# Patient Record
Sex: MALE | Employment: FULL TIME | URBAN - METROPOLITAN AREA
[De-identification: names, ages, dates, MRNs, and addresses within clinical notes are randomized per-mention and may not be internally consistent; named-entity substitution may affect disease eponyms.]

---

## 2019-11-26 ENCOUNTER — TRANSFERRED RECORDS (OUTPATIENT)
Dept: HEALTH INFORMATION MANAGEMENT | Facility: CLINIC | Age: 59
End: 2019-11-26

## 2021-02-09 ENCOUNTER — TELEPHONE (OUTPATIENT)
Dept: CARDIOLOGY | Facility: CLINIC | Age: 61
End: 2021-02-09

## 2021-02-09 NOTE — TELEPHONE ENCOUNTER
Left a vm for pt requesting he call us back at [phone]. Looking for any info or records for upcoming appt with Dr Das.

## 2021-02-15 NOTE — TELEPHONE ENCOUNTER
States he would like to be seen for elevated BP.   Recently in the dental clinic for removal of wisdom teeth, and had a BP of 157/100. Thought he should be seen by someone. His prior primary MD was Cipriano Dias out of the [facility], who has since moved out of state. He has not re-established with a new primary, which I told him that he needed to do.   Denies any HX of diabetes, CAD or kidney disease.   Stated he had some kind of a cardiac test done at [facility] years ago, unaware of what the test was. None noted in Epic.  Would just like to be seen due to elevated BP concerns.   Instructed to buy an upper arm cuff BP machine for at home and start taking his BP daily or every other day at the same time, charting it for his upcoming OV.   This will give us a better picture of his overall BP.   Will fax request to [facility] for most recent labs and any cardiac testing results along with last office visit  Note.   States he has not visited the doctor's office very often.

## 2021-03-04 ENCOUNTER — OFFICE VISIT (OUTPATIENT)
Dept: CARDIOLOGY | Facility: CLINIC | Age: 61
End: 2021-03-04
Payer: COMMERCIAL

## 2021-03-04 ENCOUNTER — TELEPHONE (OUTPATIENT)
Dept: CARDIOLOGY | Facility: CLINIC | Age: 61
End: 2021-03-04

## 2021-03-04 VITALS
BODY MASS INDEX: 35.25 KG/M2 | DIASTOLIC BLOOD PRESSURE: 94 MMHG | WEIGHT: 238 LBS | HEART RATE: 50 BPM | SYSTOLIC BLOOD PRESSURE: 151 MMHG | HEIGHT: 69 IN

## 2021-03-04 DIAGNOSIS — Z13.6 SCREENING FOR CARDIOVASCULAR CONDITION: Primary | ICD-10-CM

## 2021-03-04 DIAGNOSIS — Z13.6 SCREENING FOR CARDIOVASCULAR CONDITION: ICD-10-CM

## 2021-03-04 DIAGNOSIS — I10 BENIGN ESSENTIAL HYPERTENSION: ICD-10-CM

## 2021-03-04 LAB
ANION GAP SERPL CALCULATED.3IONS-SCNC: 3 MMOL/L (ref 3–14)
BUN SERPL-MCNC: 13 MG/DL (ref 7–30)
CALCIUM SERPL-MCNC: 9 MG/DL (ref 8.5–10.1)
CHLORIDE SERPL-SCNC: 106 MMOL/L (ref 94–109)
CHOLEST SERPL-MCNC: 178 MG/DL
CO2 SERPL-SCNC: 32 MMOL/L (ref 20–32)
CREAT SERPL-MCNC: 0.93 MG/DL (ref 0.66–1.25)
ERYTHROCYTE [DISTWIDTH] IN BLOOD BY AUTOMATED COUNT: 13.3 % (ref 10–15)
GFR SERPL CREATININE-BSD FRML MDRD: 89 ML/MIN/{1.73_M2}
GLUCOSE SERPL-MCNC: 114 MG/DL (ref 70–99)
HCT VFR BLD AUTO: 46.2 % (ref 40–53)
HDLC SERPL-MCNC: 42 MG/DL
HGB BLD-MCNC: 14.7 G/DL (ref 13.3–17.7)
LDLC SERPL CALC-MCNC: 122 MG/DL
MCH RBC QN AUTO: 28.8 PG (ref 26.5–33)
MCHC RBC AUTO-ENTMCNC: 31.8 G/DL (ref 31.5–36.5)
MCV RBC AUTO: 90 FL (ref 78–100)
NONHDLC SERPL-MCNC: 136 MG/DL
PLATELET # BLD AUTO: 247 10E9/L (ref 150–450)
POTASSIUM SERPL-SCNC: 4.1 MMOL/L (ref 3.4–5.3)
RBC # BLD AUTO: 5.11 10E12/L (ref 4.4–5.9)
SODIUM SERPL-SCNC: 141 MMOL/L (ref 133–144)
TRIGL SERPL-MCNC: 68 MG/DL
WBC # BLD AUTO: 6 10E9/L (ref 4–11)

## 2021-03-04 PROCEDURE — 80048 BASIC METABOLIC PNL TOTAL CA: CPT | Performed by: INTERNAL MEDICINE

## 2021-03-04 PROCEDURE — 80061 LIPID PANEL: CPT | Performed by: INTERNAL MEDICINE

## 2021-03-04 PROCEDURE — 85027 COMPLETE CBC AUTOMATED: CPT | Performed by: INTERNAL MEDICINE

## 2021-03-04 PROCEDURE — 93000 ELECTROCARDIOGRAM COMPLETE: CPT | Performed by: INTERNAL MEDICINE

## 2021-03-04 PROCEDURE — 36415 COLL VENOUS BLD VENIPUNCTURE: CPT | Performed by: INTERNAL MEDICINE

## 2021-03-04 PROCEDURE — 99203 OFFICE O/P NEW LOW 30 MIN: CPT | Performed by: INTERNAL MEDICINE

## 2021-03-04 RX ORDER — TIMOLOL 5 MG/ML
1 SOLUTION/ DROPS OPHTHALMIC 2 TIMES DAILY
COMMUNITY

## 2021-03-04 RX ORDER — AMLODIPINE BESYLATE 5 MG/1
5 TABLET ORAL DAILY
Qty: 30 TABLET | Refills: 3 | Status: SHIPPED | OUTPATIENT
Start: 2021-03-04 | End: 2021-07-22

## 2021-03-04 SDOH — HEALTH STABILITY: MENTAL HEALTH: HOW OFTEN DO YOU HAVE 6 OR MORE DRINKS ON ONE OCCASION?: NOT ASKED

## 2021-03-04 SDOH — HEALTH STABILITY: MENTAL HEALTH: HOW OFTEN DO YOU HAVE A DRINK CONTAINING ALCOHOL?: NOT ASKED

## 2021-03-04 SDOH — HEALTH STABILITY: MENTAL HEALTH: HOW MANY STANDARD DRINKS CONTAINING ALCOHOL DO YOU HAVE ON A TYPICAL DAY?: NOT ASKED

## 2021-03-04 ASSESSMENT — MIFFLIN-ST. JEOR: SCORE: 1879.94

## 2021-03-04 NOTE — PROGRESS NOTES
HPI and Plan:   It is my pleasure to see this very pleasant 60-year-old gentleman for evaluation of hypertension.    He no longer has a primary care physician as his neighbor and primary physician Dr. Cipriano Dias has left for [place].    He has known about hypertension for some time but has not been on any medications.  His father had hypertension.  He lived to his 80s.  He also had a slow heart rate which this patient has.  His EKG shows sinus bradycardia but he has no symptoms at all from this condition.  He is not on any medications to slow down his heart rate either and as such physiologic sinus bradycardia is actually a positive finding.    He does not smoke drink or abuse drugs.  He is not diabetic and to the best of his knowledge does not have hypercholesterolemia.  He is fasting at this time and I will check his basic labs today as well as his lipid profile.    Body mass index is 35.  He exercises every other day doing 20 minutes on the bike.  I informed him that this amount of exercise is not sufficient to promote weight loss.  He should try at least half an hour to an hour every day of aerobic exercise.  He knows he has too much sugar and fat in his diet.  He is exercising portion control and has lost about 10 pounds already.    Cardiac examination is unremarkable.    He showed me his blood pressure readings from home and they have consistently been between 1 50-1 60 systolic and  diastolic.  Until his efforts with hygienic measures bear fruit, I think you benefit from a low-dose of medications.  I will start him off on 5 mg of amlodipine.  He will continue with blood pressure monitoring.  I will see him again in a month to see how he is doing.    I have also recommended some of my good internist friends at [facility] internal medicine clinic to him.    Orders Placed This Encounter   Procedures     Basic metabolic panel     Lipid Profile     CBC with platelets     Follow-Up with Cardiologist      EKG 12-lead complete w/read - Clinics (performed today)       Orders Placed This Encounter   Medications     timolol, PF, (TIMOPTIC OCUDOSE) 0.5 % ophthalmic solution     Si drop 2 times daily     UNABLE TO FIND     Sig: Altanoprost     amLODIPine (NORVASC) 5 MG tablet     Sig: Take 1 tablet (5 mg) by mouth daily     Dispense:  30 tablet     Refill:  3       Encounter Diagnoses   Name Primary?     Screening for cardiovascular condition Yes     Benign essential hypertension        CURRENT MEDICATIONS:  Current Outpatient Medications   Medication Sig Dispense Refill     amLODIPine (NORVASC) 5 MG tablet Take 1 tablet (5 mg) by mouth daily 30 tablet 3     timolol, PF, (TIMOPTIC OCUDOSE) 0.5 % ophthalmic solution 1 drop 2 times daily       UNABLE TO FIND Altanoprost       CEFTIN 500 MG OR TABS 1 TABLET EVERY 12 HOURS (Patient not taking: No sig reported) 28 0     EPIPEN 2-HERI Use as Directed PRN (Patient not taking: Reported on 3/4/2021) 1 prn     FEXOFENADINE  MG OR TABS 1 TABLET DAILY (Patient not taking: No sig reported) 30 0     PREDNISONE 10 MG OR TABS 4 tabs daily for 2 days then 3 tabs daily for  days then 2 tabs daily for  days then 1 tab daily for 1 day then one half tab daily for 1 day then stop (Patient not taking: No sig reported) qs 0     RANITIDINE  MG OR TABS 1 TABLET TWICE DAILY (Patient not taking: No sig reported) 60 0       ALLERGIES     Allergies   Allergen Reactions     Penicillamine        PAST MEDICAL HISTORY:  Past Medical History:   Diagnosis Date     Allergic rhinitis due to other allergen      Nasal/sinus dis NEC since childhood       PAST SURGICAL HISTORY:  Past Surgical History:   Procedure Laterality Date     C ANESTH,NOSE,SINUS SURGERY       C APPENDECTOMY         FAMILY HISTORY:  Family History   Problem Relation Age of Onset     Cancer - colorectal No family hx of      Prostate Cancer No family hx of      C.A.D. No family hx of      Hypertension No family hx of   "    Diabetes No family hx of        SOCIAL HISTORY:  Social History     Socioeconomic History     Marital status:      Spouse name: Sangita     Number of children: 3     Years of education: None     Highest education level: None   Occupational History     None   Social Needs     Financial resource strain: None     Food insecurity     Worry: None     Inability: None     Transportation needs     Medical: None     Non-medical: None   Tobacco Use     Smoking status: Never Smoker     Smokeless tobacco: Never Used   Substance and Sexual Activity     Alcohol use: Yes     Comment: occ     Drug use: No     Sexual activity: Yes     Partners: Female   Lifestyle     Physical activity     Days per week: None     Minutes per session: None     Stress: None   Relationships     Social connections     Talks on phone: None     Gets together: None     Attends Zoroastrian service: None     Active member of club or organization: None     Attends meetings of clubs or organizations: None     Relationship status: None     Intimate partner violence     Fear of current or ex partner: None     Emotionally abused: None     Physically abused: None     Forced sexual activity: None   Other Topics Concern     None   Social History Narrative    from [place], [place];  1976; 3 boys               Review of Systems:  Skin:  Negative     Eyes:  Negative    ENT:  Positive for hearing loss  Respiratory:  Positive for dyspnea on exertion  Cardiovascular:  Negative    Gastroenterology: Negative    Genitourinary:  not assessed    Musculoskeletal:  Positive for back pain  Neurologic:  Positive for headaches;numbness or tingling of hands  Psychiatric:  Negative    Heme/Lymph/Imm:  Positive for allergies  Endocrine:  Negative      Physical Exam:  Vitals: BP (!) 151/94   Pulse 50   Ht 1.753 m (5' 9\")   Wt 108 kg (238 lb)   BMI 35.15 kg/m      Constitutional:  cooperative, alert and oriented, well developed, well nourished, in no acute distress    "     Skin:  warm and dry to the touch, no apparent skin lesions or masses noted          Head:  normocephalic, no masses or lesions        Eyes:  pupils equal and round, conjunctivae and lids unremarkable, sclera white, no xanthalasma, EOMS intact, no nystagmus        Lymph:No Cervical lymphadenopathy present     ENT:  no pallor or cyanosis, dentition good        Neck:  carotid pulses are full and equal bilaterally, JVP normal, no carotid bruit        Respiratory:  normal breath sounds, clear to auscultation, normal A-P diameter, normal symmetry, normal respiratory excursion, no use of accessory muscles         Cardiac: regular rhythm, normal S1/S2, no S3 or S4, apical impulse not displaced, no murmurs, gallops or rubs                pulses full and equal, no bruits auscultated                                        GI:  abdomen soft, non-tender, BS normoactive, no mass, no HSM, no bruits        Extremities and Muscular Skeletal:  no deformities, clubbing, cyanosis, erythema observed              Neurological:  no gross motor deficits        Psych:  Alert and Oriented x 3        Recent Lab Results:  LIPID RESULTS:  No results found for: CHOL, HDL, LDL, TRIG, CHOLHDLRATIO    LIVER ENZYME RESULTS:  No results found for: AST, ALT    CBC RESULTS:  No results found for: WBC, RBC, HGB, HCT, MCV, MCH, MCHC, RDW, PLT    BMP RESULTS:  No results found for: NA, POTASSIUM, CHLORIDE, CO2, ANIONGAP, GLC, BUN, CR, GFRESTIMATED, GFRESTBLACK, ASHVIN     A1C RESULTS:  No results found for: A1C    INR RESULTS:  No results found for: INR        CC  No referring provider defined for this encounter.

## 2021-03-04 NOTE — LETTER
3/4/2021    Physician No Ref-Primary  No address on file    RE: Alvino Rivera       Dear Colleague,    I had the pleasure of seeing Alvino WAYNE Iverson in the [facility].    HPI and Plan:   It is my pleasure to see this very pleasant 60-year-old gentleman for evaluation of hypertension.    He no longer has a primary care physician as his neighbor and primary physician Dr. Cipriano Dias has left for [place].    He has known about hypertension for some time but has not been on any medications.  His father had hypertension.  He lived to his 80s.  He also had a slow heart rate which this patient has.  His EKG shows sinus bradycardia but he has no symptoms at all from this condition.  He is not on any medications to slow down his heart rate either and as such physiologic sinus bradycardia is actually a positive finding.    He does not smoke drink or abuse drugs.  He is not diabetic and to the best of his knowledge does not have hypercholesterolemia.  He is fasting at this time and I will check his basic labs today as well as his lipid profile.    Body mass index is 35.  He exercises every other day doing 20 minutes on the bike.  I informed him that this amount of exercise is not sufficient to promote weight loss.  He should try at least half an hour to an hour every day of aerobic exercise.  He knows he has too much sugar and fat in his diet.  He is exercising portion control and has lost about 10 pounds already.    Cardiac examination is unremarkable.    He showed me his blood pressure readings from home and they have consistently been between 1 50-1 60 systolic and  diastolic.  Until his efforts with hygienic measures bear fruit, I think you benefit from a low-dose of medications.  I will start him off on 5 mg of amlodipine.  He will continue with blood pressure monitoring.  I will see him again in a month to see how he is doing.    I have also recommended some  of my good internist friends at [facility] internal medicine clinic to him.    Orders Placed This Encounter   Procedures     Basic metabolic panel     Lipid Profile     CBC with platelets     Follow-Up with Cardiologist     EKG 12-lead complete w/read - Clinics (performed today)       Orders Placed This Encounter   Medications     timolol, PF, (TIMOPTIC OCUDOSE) 0.5 % ophthalmic solution     Si drop 2 times daily     UNABLE TO FIND     Sig: Altanoprost     amLODIPine (NORVASC) 5 MG tablet     Sig: Take 1 tablet (5 mg) by mouth daily     Dispense:  30 tablet     Refill:  3       Encounter Diagnoses   Name Primary?     Screening for cardiovascular condition Yes     Benign essential hypertension        CURRENT MEDICATIONS:  Current Outpatient Medications   Medication Sig Dispense Refill     amLODIPine (NORVASC) 5 MG tablet Take 1 tablet (5 mg) by mouth daily 30 tablet 3     timolol, PF, (TIMOPTIC OCUDOSE) 0.5 % ophthalmic solution 1 drop 2 times daily       UNABLE TO FIND Altanoprost       CEFTIN 500 MG OR TABS 1 TABLET EVERY 12 HOURS (Patient not taking: No sig reported) 28 0     EPIPEN 2-HERI Use as Directed PRN (Patient not taking: Reported on 3/4/2021) 1 prn     FEXOFENADINE  MG OR TABS 1 TABLET DAILY (Patient not taking: No sig reported) 30 0     PREDNISONE 10 MG OR TABS 4 tabs daily for 2 days then 3 tabs daily for  days then 2 tabs daily for  days then 1 tab daily for 1 day then one half tab daily for 1 day then stop (Patient not taking: No sig reported) qs 0     RANITIDINE  MG OR TABS 1 TABLET TWICE DAILY (Patient not taking: No sig reported) 60 0       ALLERGIES     Allergies   Allergen Reactions     Penicillamine        PAST MEDICAL HISTORY:  Past Medical History:   Diagnosis Date     Allergic rhinitis due to other allergen      Nasal/sinus dis NEC since childhood       PAST SURGICAL HISTORY:  Past Surgical History:   Procedure Laterality Date     C ANESTH,NOSE,SINUS SURGERY       C  APPENDECTOMY  1969       FAMILY HISTORY:  Family History   Problem Relation Age of Onset     Cancer - colorectal No family hx of      Prostate Cancer No family hx of      C.A.D. No family hx of      Hypertension No family hx of      Diabetes No family hx of        SOCIAL HISTORY:  Social History     Socioeconomic History     Marital status:      Spouse name: Sangita     Number of children: 3     Years of education: None     Highest education level: None   Occupational History     None   Social Needs     Financial resource strain: None     Food insecurity     Worry: None     Inability: None     Transportation needs     Medical: None     Non-medical: None   Tobacco Use     Smoking status: Never Smoker     Smokeless tobacco: Never Used   Substance and Sexual Activity     Alcohol use: Yes     Comment: occ     Drug use: No     Sexual activity: Yes     Partners: Female   Lifestyle     Physical activity     Days per week: None     Minutes per session: None     Stress: None   Relationships     Social connections     Talks on phone: None     Gets together: None     Attends Cheondoism service: None     Active member of club or organization: None     Attends meetings of clubs or organizations: None     Relationship status: None     Intimate partner violence     Fear of current or ex partner: None     Emotionally abused: None     Physically abused: None     Forced sexual activity: None   Other Topics Concern     None   Social History Narrative    from [place];  1976; 3 boys               Review of Systems:  Skin:  Negative     Eyes:  Negative    ENT:  Positive for hearing loss  Respiratory:  Positive for dyspnea on exertion  Cardiovascular:  Negative    Gastroenterology: Negative    Genitourinary:  not assessed    Musculoskeletal:  Positive for back pain  Neurologic:  Positive for headaches;numbness or tingling of hands  Psychiatric:  Negative    Heme/Lymph/Imm:  Positive for allergies  Endocrine:  Negative   "    Physical Exam:  Vitals: BP (!) 151/94   Pulse 50   Ht 1.753 m (5' 9\")   Wt 108 kg (238 lb)   BMI 35.15 kg/m      Constitutional:  cooperative, alert and oriented, well developed, well nourished, in no acute distress        Skin:  warm and dry to the touch, no apparent skin lesions or masses noted          Head:  normocephalic, no masses or lesions        Eyes:  pupils equal and round, conjunctivae and lids unremarkable, sclera white, no xanthalasma, EOMS intact, no nystagmus        Lymph:No Cervical lymphadenopathy present     ENT:  no pallor or cyanosis, dentition good        Neck:  carotid pulses are full and equal bilaterally, JVP normal, no carotid bruit        Respiratory:  normal breath sounds, clear to auscultation, normal A-P diameter, normal symmetry, normal respiratory excursion, no use of accessory muscles         Cardiac: regular rhythm, normal S1/S2, no S3 or S4, apical impulse not displaced, no murmurs, gallops or rubs                pulses full and equal, no bruits auscultated                                        GI:  abdomen soft, non-tender, BS normoactive, no mass, no HSM, no bruits        Extremities and Muscular Skeletal:  no deformities, clubbing, cyanosis, erythema observed              Neurological:  no gross motor deficits        Psych:  Alert and Oriented x 3        Recent Lab Results:  LIPID RESULTS:  No results found for: CHOL, HDL, LDL, TRIG, CHOLHDLRATIO    LIVER ENZYME RESULTS:  No results found for: AST, ALT    CBC RESULTS:  No results found for: WBC, RBC, HGB, HCT, MCV, MCH, MCHC, RDW, PLT    BMP RESULTS:  No results found for: NA, POTASSIUM, CHLORIDE, CO2, ANIONGAP, GLC, BUN, CR, GFRESTIMATED, GFRESTBLACK, ASHVIN     A1C RESULTS:  No results found for: A1C    INR RESULTS:  No results found for: INR        CC  No referring provider defined for this encounter.                    Thank you for allowing me to participate in the care of your patient.      Sincerely,     DR BAKARI LEON" MD CARLOS     [facility]  cc:   No referring provider defined for this encounter.

## 2021-03-04 NOTE — TELEPHONE ENCOUNTER
Left message with Dr. Das's review of labs drawn today and if patient would like more details prior to OV next month to call Team [phone].        ----- Message from Zhang Das MD sent at 3/4/2021 11:48 AM CST -----  Pls let him know labs are good so far, will further review at OV.  Thanks.

## 2021-03-04 NOTE — LETTER
Date:April 8, 2021      Patient was self referred, no letter generated. Do not send.        [facility] Health Information

## 2021-04-21 ENCOUNTER — OFFICE VISIT (OUTPATIENT)
Dept: CARDIOLOGY | Facility: CLINIC | Age: 61
End: 2021-04-21
Attending: INTERNAL MEDICINE
Payer: COMMERCIAL

## 2021-04-21 VITALS
DIASTOLIC BLOOD PRESSURE: 97 MMHG | SYSTOLIC BLOOD PRESSURE: 159 MMHG | HEIGHT: 69 IN | HEART RATE: 49 BPM | BODY MASS INDEX: 35.99 KG/M2 | WEIGHT: 243 LBS

## 2021-04-21 DIAGNOSIS — Z13.6 SCREENING FOR CARDIOVASCULAR CONDITION: ICD-10-CM

## 2021-04-21 DIAGNOSIS — I10 BENIGN ESSENTIAL HYPERTENSION: ICD-10-CM

## 2021-04-21 PROCEDURE — 99214 OFFICE O/P EST MOD 30 MIN: CPT | Performed by: INTERNAL MEDICINE

## 2021-04-21 RX ORDER — LISINOPRIL 5 MG/1
5 TABLET ORAL DAILY
Qty: 30 TABLET | Refills: 3 | Status: SHIPPED | OUTPATIENT
Start: 2021-04-21 | End: 2021-10-04

## 2021-04-21 ASSESSMENT — MIFFLIN-ST. JEOR: SCORE: 1897.62

## 2021-04-21 NOTE — LETTER
Date:Keeley 10, 2021      Patient was self referred, no letter generated. Do not send.        [facility] Health Information

## 2021-04-21 NOTE — PROGRESS NOTES
HPI and Plan:   The pleasure to see this delightful gentleman again for follow-up of hypertension.  He ran out of meds recently and therefore his blood pressure is little high in our offices.  Told him to renew amlodipine which is well-tolerated.  However blood pressure at home is still around 150/90.  I think it is time to add another medication I think lisinopril will make it very good addition to amlodipine.  Possible side effects were discussed.  I will have him come back in a month's time to have his mixed lites checked.  We will continue to monitor his home blood pressures aiming for around 130/80 or less.  I look forward to seeing him again in a few months time for further follow-up.    Orders Placed This Encounter   Procedures     Basic metabolic panel     Follow-Up with Cardiologist       Orders Placed This Encounter   Medications     lisinopril (ZESTRIL) 5 MG tablet     Sig: Take 1 tablet (5 mg) by mouth daily     Dispense:  30 tablet     Refill:  3       Encounter Diagnoses   Name Primary?     Screening for cardiovascular condition      Benign essential hypertension        CURRENT MEDICATIONS:  Current Outpatient Medications   Medication Sig Dispense Refill     lisinopril (ZESTRIL) 5 MG tablet Take 1 tablet (5 mg) by mouth daily 30 tablet 3     timolol, PF, (TIMOPTIC OCUDOSE) 0.5 % ophthalmic solution 1 drop 2 times daily       UNABLE TO FIND Altanoprost       amLODIPine (NORVASC) 5 MG tablet Take 1 tablet (5 mg) by mouth daily (Patient not taking: Reported on 4/21/2021) 30 tablet 3     CEFTIN 500 MG OR TABS 1 TABLET EVERY 12 HOURS (Patient not taking: No sig reported) 28 0     EPIPEN 2-HERI Use as Directed PRN (Patient not taking: Reported on 3/4/2021) 1 prn     FEXOFENADINE  MG OR TABS 1 TABLET DAILY (Patient not taking: No sig reported) 30 0     PREDNISONE 10 MG OR TABS 4 tabs daily for 2 days then 3 tabs daily for  days then 2 tabs daily for  days then 1 tab daily for 1 day then one half tab  daily for 1 day then stop (Patient not taking: No sig reported) qs 0     RANITIDINE  MG OR TABS 1 TABLET TWICE DAILY (Patient not taking: No sig reported) 60 0       ALLERGIES     Allergies   Allergen Reactions     Penicillamine        PAST MEDICAL HISTORY:  Past Medical History:   Diagnosis Date     Allergic rhinitis due to other allergen      Nasal/sinus dis NEC since childhood       PAST SURGICAL HISTORY:  Past Surgical History:   Procedure Laterality Date     C ANESTH,NOSE,SINUS SURGERY  1980     C APPENDECTOMY  1969       FAMILY HISTORY:  Family History   Problem Relation Age of Onset     Cancer - colorectal No family hx of      Prostate Cancer No family hx of      C.A.D. No family hx of      Hypertension No family hx of      Diabetes No family hx of        SOCIAL HISTORY:  Social History     Socioeconomic History     Marital status:      Spouse name: Sangita     Number of children: 3     Years of education: None     Highest education level: None   Occupational History     None   Social Needs     Financial resource strain: None     Food insecurity     Worry: None     Inability: None     Transportation needs     Medical: None     Non-medical: None   Tobacco Use     Smoking status: Never Smoker     Smokeless tobacco: Never Used   Substance and Sexual Activity     Alcohol use: Yes     Comment: occ     Drug use: No     Sexual activity: Yes     Partners: Female   Lifestyle     Physical activity     Days per week: None     Minutes per session: None     Stress: None   Relationships     Social connections     Talks on phone: None     Gets together: None     Attends Restorationism service: None     Active member of club or organization: None     Attends meetings of clubs or organizations: None     Relationship status: None     Intimate partner violence     Fear of current or ex partner: None     Emotionally abused: None     Physically abused: None     Forced sexual activity: None   Other Topics Concern     None  "  Social History Narrative    from [place], [place];  1976; 3 boys               Review of Systems:  Skin:  Negative     Eyes:  Negative    ENT:  Negative    Respiratory:  Negative    Cardiovascular:  Negative    Gastroenterology: Negative    Genitourinary:  not assessed    Musculoskeletal:  Negative    Neurologic:  Positive for numbness or tingling of hands  Psychiatric:  Negative    Heme/Lymph/Imm:  Positive for allergies  Endocrine:  Negative      Physical Exam:  Vitals: BP (!) 159/97   Pulse (!) 49   Ht 1.753 m (5' 9\")   Wt 110.2 kg (243 lb)   BMI 35.88 kg/m      Constitutional:  cooperative, alert and oriented, well developed, well nourished, in no acute distress        Skin:  warm and dry to the touch, no apparent skin lesions or masses noted          Head:  normocephalic, no masses or lesions        Eyes:  pupils equal and round, conjunctivae and lids unremarkable, sclera white, no xanthalasma, EOMS intact, no nystagmus        Lymph:No Cervical lymphadenopathy present     ENT:  no pallor or cyanosis, dentition good        Neck:  carotid pulses are full and equal bilaterally, JVP normal, no carotid bruit        Respiratory:  normal breath sounds, clear to auscultation, normal A-P diameter, normal symmetry, normal respiratory excursion, no use of accessory muscles         Cardiac: regular rhythm, normal S1/S2, no S3 or S4, apical impulse not displaced, no murmurs, gallops or rubs                pulses full and equal, no bruits auscultated                                        GI:  abdomen soft, non-tender, BS normoactive, no mass, no HSM, no bruits        Extremities and Muscular Skeletal:  no deformities, clubbing, cyanosis, erythema observed              Neurological:  no gross motor deficits        Psych:  Alert and Oriented x 3        Recent Lab Results:  LIPID RESULTS:  Lab Results   Component Value Date    CHOL 178 03/04/2021    HDL 42 03/04/2021     (H) 03/04/2021    TRIG 68 03/04/2021 "       LIVER ENZYME RESULTS:  No results found for: AST, ALT    CBC RESULTS:  Lab Results   Component Value Date    WBC 6.0 03/04/2021    RBC 5.11 03/04/2021    HGB 14.7 03/04/2021    HCT 46.2 03/04/2021    MCV 90 03/04/2021    MCH 28.8 03/04/2021    MCHC 31.8 03/04/2021    RDW 13.3 03/04/2021     03/04/2021       BMP RESULTS:  Lab Results   Component Value Date     03/04/2021    POTASSIUM 4.1 03/04/2021    CHLORIDE 106 03/04/2021    CO2 32 03/04/2021    ANIONGAP 3 03/04/2021     (H) 03/04/2021    BUN 13 03/04/2021    CR 0.93 03/04/2021    GFRESTIMATED 89 03/04/2021    GFRESTBLACK >90 03/04/2021    ASHVIN 9.0 03/04/2021        A1C RESULTS:  No results found for: A1C    INR RESULTS:  No results found for: INR        CC  Zhang Das MD  [id number]  [place] [id number]

## 2021-05-05 DIAGNOSIS — Z13.6 SCREENING FOR CARDIOVASCULAR CONDITION: ICD-10-CM

## 2021-05-05 DIAGNOSIS — I10 BENIGN ESSENTIAL HYPERTENSION: ICD-10-CM

## 2021-05-05 LAB
ANION GAP SERPL CALCULATED.3IONS-SCNC: 4 MMOL/L (ref 3–14)
BUN SERPL-MCNC: 13 MG/DL (ref 7–30)
CALCIUM SERPL-MCNC: 8.6 MG/DL (ref 8.5–10.1)
CHLORIDE SERPL-SCNC: 105 MMOL/L (ref 94–109)
CO2 SERPL-SCNC: 30 MMOL/L (ref 20–32)
CREAT SERPL-MCNC: 1.01 MG/DL (ref 0.66–1.25)
GFR SERPL CREATININE-BSD FRML MDRD: 80 ML/MIN/{1.73_M2}
GLUCOSE SERPL-MCNC: 116 MG/DL (ref 70–99)
POTASSIUM SERPL-SCNC: 4.2 MMOL/L (ref 3.4–5.3)
SODIUM SERPL-SCNC: 139 MMOL/L (ref 133–144)

## 2021-05-05 PROCEDURE — 80048 BASIC METABOLIC PNL TOTAL CA: CPT | Performed by: INTERNAL MEDICINE

## 2021-05-05 PROCEDURE — 36415 COLL VENOUS BLD VENIPUNCTURE: CPT | Performed by: INTERNAL MEDICINE

## 2021-07-22 DIAGNOSIS — Z13.6 SCREENING FOR CARDIOVASCULAR CONDITION: ICD-10-CM

## 2021-07-22 RX ORDER — AMLODIPINE BESYLATE 5 MG/1
5 TABLET ORAL DAILY
Qty: 90 TABLET | Refills: 0 | Status: SHIPPED | OUTPATIENT
Start: 2021-07-22 | End: 2021-11-17

## 2021-10-04 DIAGNOSIS — I10 BENIGN ESSENTIAL HYPERTENSION: ICD-10-CM

## 2021-10-04 DIAGNOSIS — Z13.6 SCREENING FOR CARDIOVASCULAR CONDITION: ICD-10-CM

## 2021-10-04 RX ORDER — LISINOPRIL 5 MG/1
5 TABLET ORAL DAILY
Qty: 90 TABLET | Refills: 1 | Status: SHIPPED | OUTPATIENT
Start: 2021-10-04 | End: 2022-04-25

## 2021-10-04 NOTE — TELEPHONE ENCOUNTER
Medication Refilled: Lisinopril  Last office visit: 21 with Dr. Das  Last Labs/EK2021  Next office visit: 2021with Dr. Das   Pharmacy sent to: Jeremy Barber RN

## 2021-11-17 DIAGNOSIS — Z13.6 SCREENING FOR CARDIOVASCULAR CONDITION: ICD-10-CM

## 2021-11-17 RX ORDER — AMLODIPINE BESYLATE 5 MG/1
5 TABLET ORAL DAILY
Qty: 90 TABLET | Refills: 0 | Status: SHIPPED | OUTPATIENT
Start: 2021-11-17 | End: 2022-03-21

## 2021-12-02 ENCOUNTER — OFFICE VISIT (OUTPATIENT)
Dept: CARDIOLOGY | Facility: CLINIC | Age: 61
End: 2021-12-02
Payer: COMMERCIAL

## 2021-12-02 VITALS
HEART RATE: 55 BPM | BODY MASS INDEX: 36.2 KG/M2 | OXYGEN SATURATION: 97 % | DIASTOLIC BLOOD PRESSURE: 80 MMHG | WEIGHT: 244.4 LBS | SYSTOLIC BLOOD PRESSURE: 126 MMHG | HEIGHT: 69 IN

## 2021-12-02 DIAGNOSIS — I10 BENIGN ESSENTIAL HYPERTENSION: ICD-10-CM

## 2021-12-02 DIAGNOSIS — Z13.6 SCREENING FOR CARDIOVASCULAR CONDITION: ICD-10-CM

## 2021-12-02 PROCEDURE — 99213 OFFICE O/P EST LOW 20 MIN: CPT | Performed by: INTERNAL MEDICINE

## 2021-12-02 ASSESSMENT — MIFFLIN-ST. JEOR: SCORE: 1903.97

## 2021-12-02 NOTE — PROGRESS NOTES
HPI and Plan:   Very pleasant 61-year-old gentleman who is back here for follow-up of hypertension.    I am seeing him for this condition because his primary care physician had left for [place].  On previous clinic visit I did recommend some of my primary care colleagues but he has not established care with them yet.  I again gave him some names and phone numbers to contact.    He is doing well.  He has no cardiac symptoms.  His blood pressure meds are well-tolerated.  Cardiac examination is unremarkable.    He can continue with current doses of his antihypertensives particularly as he tells me home blood pressure readings are consistently around 130/80 or less.  I am sure with some continued weight loss his blood pressure will be even better.  I strongly advised portion control.      I have provisionally made arrangements see him again in 1 years time.  If his blood pressure continues to be under good control and he has establish care with a primary care physician perhaps the clinic visit may not be necessary.    No orders of the defined types were placed in this encounter.      No orders of the defined types were placed in this encounter.      Encounter Diagnoses   Name Primary?     Screening for cardiovascular condition      Benign essential hypertension        CURRENT MEDICATIONS:  Current Outpatient Medications   Medication Sig Dispense Refill     amLODIPine (NORVASC) 5 MG tablet Take 1 tablet (5 mg) by mouth daily 90 tablet 0     lisinopril (ZESTRIL) 5 MG tablet Take 1 tablet (5 mg) by mouth daily 90 tablet 1     CEFTIN 500 MG OR TABS 1 TABLET EVERY 12 HOURS (Patient not taking: No sig reported) 28 0     EPIPEN 2-HERI Use as Directed PRN (Patient not taking: Reported on 3/4/2021) 1 prn     FEXOFENADINE  MG OR TABS 1 TABLET DAILY (Patient not taking: No sig reported) 30 0     PREDNISONE 10 MG OR TABS 4 tabs daily for 2 days then 3 tabs daily for  days then 2 tabs daily for  days then 1 tab daily for 1 day  then one half tab daily for 1 day then stop (Patient not taking: No sig reported) qs 0     RANITIDINE  MG OR TABS 1 TABLET TWICE DAILY (Patient not taking: No sig reported) 60 0     timolol, PF, (TIMOPTIC OCUDOSE) 0.5 % ophthalmic solution 1 drop 2 times daily       UNABLE TO FIND Altanoprost         ALLERGIES     Allergies   Allergen Reactions     Penicillamine        PAST MEDICAL HISTORY:  Past Medical History:   Diagnosis Date     Allergic rhinitis due to other allergen      Nasal/sinus dis NEC since childhood       PAST SURGICAL HISTORY:  Past Surgical History:   Procedure Laterality Date     C ANESTH,NOSE,SINUS SURGERY  1980     C APPENDECTOMY  1969       FAMILY HISTORY:  Family History   Problem Relation Age of Onset     Cancer - colorectal No family hx of      Prostate Cancer No family hx of      C.A.D. No family hx of      Hypertension No family hx of      Diabetes No family hx of        SOCIAL HISTORY:  Social History     Socioeconomic History     Marital status:      Spouse name: Sangita     Number of children: 3     Years of education: None     Highest education level: None   Occupational History     None   Tobacco Use     Smoking status: Never Smoker     Smokeless tobacco: Never Used   Substance and Sexual Activity     Alcohol use: Yes     Comment: occ     Drug use: No     Sexual activity: Yes     Partners: Female   Other Topics Concern     None   Social History Narrative    from [place], [place];  1976; 3 boys             Social Determinants of Health     Financial Resource Strain: Not on file   Food Insecurity: Not on file   Transportation Needs: Not on file   Physical Activity: Not on file   Stress: Not on file   Social Connections: Not on file   Intimate Partner Violence: Not on file   Housing Stability: Not on file       Review of Systems:  Skin:  Negative     Eyes:  Negative    ENT:  Negative    Respiratory:  Negative    Cardiovascular:    edema;Positive for  Gastroenterology:  "Negative    Genitourinary:  not assessed    Musculoskeletal:  Negative    Neurologic:  Negative    Psychiatric:  Negative    Heme/Lymph/Imm:  Negative    Endocrine:  Negative      Physical Exam:  Vitals: /80 (BP Location: Right arm, Patient Position: Sitting, Cuff Size: Adult Regular)   Pulse 55   Ht 1.753 m (5' 9\")   Wt 110.9 kg (244 lb 6.4 oz)   SpO2 97%   BMI 36.09 kg/m      Constitutional:  cooperative, alert and oriented, well developed, well nourished, in no acute distress        Skin:  warm and dry to the touch, no apparent skin lesions or masses noted          Head:  normocephalic, no masses or lesions        Eyes:  pupils equal and round, conjunctivae and lids unremarkable, sclera white, no xanthalasma, EOMS intact, no nystagmus        Lymph:No Cervical lymphadenopathy present     ENT:  no pallor or cyanosis, dentition good        Neck:  carotid pulses are full and equal bilaterally, JVP normal, no carotid bruit        Respiratory:  normal breath sounds, clear to auscultation, normal A-P diameter, normal symmetry, normal respiratory excursion, no use of accessory muscles         Cardiac: regular rhythm, normal S1/S2, no S3 or S4, apical impulse not displaced, no murmurs, gallops or rubs                pulses full and equal, no bruits auscultated                                        GI:  abdomen soft, non-tender, BS normoactive, no mass, no HSM, no bruits        Extremities and Muscular Skeletal:  no deformities, clubbing, cyanosis, erythema observed              Neurological:  no gross motor deficits        Psych:  Alert and Oriented x 3        Recent Lab Results:  LIPID RESULTS:  Lab Results   Component Value Date    CHOL 178 03/04/2021    HDL 42 03/04/2021     (H) 03/04/2021    TRIG 68 03/04/2021       LIVER ENZYME RESULTS:  No results found for: AST, ALT    CBC RESULTS:  Lab Results   Component Value Date    WBC 6.0 03/04/2021    RBC 5.11 03/04/2021    HGB 14.7 03/04/2021    HCT 46.2 " 03/04/2021    MCV 90 03/04/2021    MCH 28.8 03/04/2021    MCHC 31.8 03/04/2021    RDW 13.3 03/04/2021     03/04/2021       BMP RESULTS:  Lab Results   Component Value Date     05/05/2021    POTASSIUM 4.2 05/05/2021    CHLORIDE 105 05/05/2021    CO2 30 05/05/2021    ANIONGAP 4 05/05/2021     (H) 05/05/2021    BUN 13 05/05/2021    CR 1.01 05/05/2021    GFRESTIMATED 80 05/05/2021    GFRESTBLACK >90 05/05/2021    ASHVIN 8.6 05/05/2021        A1C RESULTS:  No results found for: A1C    INR RESULTS:  No results found for: INR        CC  Zhang Das MD  [id number]  [place] [id number]

## 2021-12-02 NOTE — LETTER
12/2/2021    Physician No Ref-Primary  No address on file    RE: Alvino Rivera       Dear Colleague,    I had the pleasure of seeing Alvino WAYNE Iverson in the [facility].    HPI and Plan:   Very pleasant 61-year-old gentleman who is back here for follow-up of hypertension.    I am seeing him for this condition because his primary care physician had left for [place].  On previous clinic visit I did recommend some of my primary care colleagues but he has not established care with them yet.  I again gave him some names and phone numbers to contact.    He is doing well.  He has no cardiac symptoms.  His blood pressure meds are well-tolerated.  Cardiac examination is unremarkable.    He can continue with current doses of his antihypertensives particularly as he tells me home blood pressure readings are consistently around 130/80 or less.  I am sure with some continued weight loss his blood pressure will be even better.  I strongly advised portion control.      I have provisionally made arrangements see him again in 1 years time.  If his blood pressure continues to be under good control and he has establish care with a primary care physician perhaps the clinic visit may not be necessary.    No orders of the defined types were placed in this encounter.      No orders of the defined types were placed in this encounter.      Encounter Diagnoses   Name Primary?     Screening for cardiovascular condition      Benign essential hypertension        CURRENT MEDICATIONS:  Current Outpatient Medications   Medication Sig Dispense Refill     amLODIPine (NORVASC) 5 MG tablet Take 1 tablet (5 mg) by mouth daily 90 tablet 0     lisinopril (ZESTRIL) 5 MG tablet Take 1 tablet (5 mg) by mouth daily 90 tablet 1     CEFTIN 500 MG OR TABS 1 TABLET EVERY 12 HOURS (Patient not taking: No sig reported) 28 0     EPIPEN 2-HERI Use as Directed PRN (Patient not taking: Reported on 3/4/2021) 1 prn      FEXOFENADINE  MG OR TABS 1 TABLET DAILY (Patient not taking: No sig reported) 30 0     PREDNISONE 10 MG OR TABS 4 tabs daily for 2 days then 3 tabs daily for  days then 2 tabs daily for  days then 1 tab daily for 1 day then one half tab daily for 1 day then stop (Patient not taking: No sig reported) qs 0     RANITIDINE  MG OR TABS 1 TABLET TWICE DAILY (Patient not taking: No sig reported) 60 0     timolol, PF, (TIMOPTIC OCUDOSE) 0.5 % ophthalmic solution 1 drop 2 times daily       UNABLE TO FIND Altanoprost         ALLERGIES     Allergies   Allergen Reactions     Penicillamine        PAST MEDICAL HISTORY:  Past Medical History:   Diagnosis Date     Allergic rhinitis due to other allergen      Nasal/sinus dis NEC since childhood       PAST SURGICAL HISTORY:  Past Surgical History:   Procedure Laterality Date     C ANESTH,NOSE,SINUS SURGERY  1980     C APPENDECTOMY  1969       FAMILY HISTORY:  Family History   Problem Relation Age of Onset     Cancer - colorectal No family hx of      Prostate Cancer No family hx of      C.A.D. No family hx of      Hypertension No family hx of      Diabetes No family hx of        SOCIAL HISTORY:  Social History     Socioeconomic History     Marital status:      Spouse name: Sangita     Number of children: 3     Years of education: None     Highest education level: None   Occupational History     None   Tobacco Use     Smoking status: Never Smoker     Smokeless tobacco: Never Used   Substance and Sexual Activity     Alcohol use: Yes     Comment: occ     Drug use: No     Sexual activity: Yes     Partners: Female   Other Topics Concern     None   Social History Narrative    from [place];  1976; 3 boys             Social Determinants of Health     Financial Resource Strain: Not on file   Food Insecurity: Not on file   Transportation Needs: Not on file   Physical Activity: Not on file   Stress: Not on file   Social Connections: Not on file   Intimate  "Partner Violence: Not on file   Housing Stability: Not on file       Review of Systems:  Skin:  Negative     Eyes:  Negative    ENT:  Negative    Respiratory:  Negative    Cardiovascular:    edema;Positive for  Gastroenterology: Negative    Genitourinary:  not assessed    Musculoskeletal:  Negative    Neurologic:  Negative    Psychiatric:  Negative    Heme/Lymph/Imm:  Negative    Endocrine:  Negative      Physical Exam:  Vitals: /80 (BP Location: Right arm, Patient Position: Sitting, Cuff Size: Adult Regular)   Pulse 55   Ht 1.753 m (5' 9\")   Wt 110.9 kg (244 lb 6.4 oz)   SpO2 97%   BMI 36.09 kg/m      Constitutional:  cooperative, alert and oriented, well developed, well nourished, in no acute distress        Skin:  warm and dry to the touch, no apparent skin lesions or masses noted          Head:  normocephalic, no masses or lesions        Eyes:  pupils equal and round, conjunctivae and lids unremarkable, sclera white, no xanthalasma, EOMS intact, no nystagmus        Lymph:No Cervical lymphadenopathy present     ENT:  no pallor or cyanosis, dentition good        Neck:  carotid pulses are full and equal bilaterally, JVP normal, no carotid bruit        Respiratory:  normal breath sounds, clear to auscultation, normal A-P diameter, normal symmetry, normal respiratory excursion, no use of accessory muscles         Cardiac: regular rhythm, normal S1/S2, no S3 or S4, apical impulse not displaced, no murmurs, gallops or rubs                pulses full and equal, no bruits auscultated                                        GI:  abdomen soft, non-tender, BS normoactive, no mass, no HSM, no bruits        Extremities and Muscular Skeletal:  no deformities, clubbing, cyanosis, erythema observed              Neurological:  no gross motor deficits        Psych:  Alert and Oriented x 3        Recent Lab Results:  LIPID RESULTS:  Lab Results   Component Value Date    CHOL 178 03/04/2021    HDL 42 03/04/2021     " (H) 03/04/2021    TRIG 68 03/04/2021       LIVER ENZYME RESULTS:  No results found for: AST, ALT    CBC RESULTS:  Lab Results   Component Value Date    WBC 6.0 03/04/2021    RBC 5.11 03/04/2021    HGB 14.7 03/04/2021    HCT 46.2 03/04/2021    MCV 90 03/04/2021    MCH 28.8 03/04/2021    MCHC 31.8 03/04/2021    RDW 13.3 03/04/2021     03/04/2021       BMP RESULTS:  Lab Results   Component Value Date     05/05/2021    POTASSIUM 4.2 05/05/2021    CHLORIDE 105 05/05/2021    CO2 30 05/05/2021    ANIONGAP 4 05/05/2021     (H) 05/05/2021    BUN 13 05/05/2021    CR 1.01 05/05/2021    GFRESTIMATED 80 05/05/2021    GFRESTBLACK >90 05/05/2021    ASHVIN 8.6 05/05/2021        A1C RESULTS:  No results found for: A1C    INR RESULTS:  No results found for: INR        CC  Bakari Das MD  [address]  [place] [address]                    Thank you for allowing me to participate in the care of your patient.      Sincerely,     DR BAKARI DAS MD     [facility]  cc:   Bakari Das MD  [address]  [place] [address]

## 2021-12-02 NOTE — LETTER
Date:January 19, 2022      Provider requested that no letter be sent. Do not send.       [facility]

## 2022-03-21 DIAGNOSIS — Z13.6 SCREENING FOR CARDIOVASCULAR CONDITION: ICD-10-CM

## 2022-03-21 RX ORDER — AMLODIPINE BESYLATE 5 MG/1
5 TABLET ORAL DAILY
Qty: 90 TABLET | Refills: 3 | Status: SHIPPED | OUTPATIENT
Start: 2022-03-21 | End: 2022-05-25

## 2022-04-25 DIAGNOSIS — Z13.6 SCREENING FOR CARDIOVASCULAR CONDITION: ICD-10-CM

## 2022-04-25 DIAGNOSIS — I10 BENIGN ESSENTIAL HYPERTENSION: ICD-10-CM

## 2022-04-25 RX ORDER — LISINOPRIL 5 MG/1
5 TABLET ORAL DAILY
Qty: 90 TABLET | Refills: 2 | Status: SHIPPED | OUTPATIENT
Start: 2022-04-25 | End: 2022-05-25

## 2022-04-25 RX ORDER — LISINOPRIL 5 MG/1
5 TABLET ORAL DAILY
Qty: 90 TABLET | Refills: 2 | Status: SHIPPED | OUTPATIENT
Start: 2022-04-25 | End: 2022-04-25

## 2022-05-25 DIAGNOSIS — I10 BENIGN ESSENTIAL HYPERTENSION: ICD-10-CM

## 2022-05-25 DIAGNOSIS — Z13.6 SCREENING FOR CARDIOVASCULAR CONDITION: ICD-10-CM

## 2022-05-25 RX ORDER — AMLODIPINE BESYLATE 5 MG/1
5 TABLET ORAL DAILY
Qty: 90 TABLET | Refills: 1 | Status: SHIPPED | OUTPATIENT
Start: 2022-05-25

## 2022-05-25 RX ORDER — LISINOPRIL 5 MG/1
5 TABLET ORAL DAILY
Qty: 90 TABLET | Refills: 1 | Status: SHIPPED | OUTPATIENT
Start: 2022-05-25
